# Patient Record
Sex: FEMALE | Race: WHITE | NOT HISPANIC OR LATINO | Employment: PART TIME | URBAN - METROPOLITAN AREA
[De-identification: names, ages, dates, MRNs, and addresses within clinical notes are randomized per-mention and may not be internally consistent; named-entity substitution may affect disease eponyms.]

---

## 2019-09-03 ENCOUNTER — APPOINTMENT (OUTPATIENT)
Dept: RADIOLOGY | Facility: MEDICAL CENTER | Age: 59
End: 2019-09-03
Attending: EMERGENCY MEDICINE

## 2019-09-03 ENCOUNTER — HOSPITAL ENCOUNTER (EMERGENCY)
Facility: MEDICAL CENTER | Age: 59
End: 2019-09-03
Attending: EMERGENCY MEDICINE

## 2019-09-03 VITALS
SYSTOLIC BLOOD PRESSURE: 134 MMHG | HEIGHT: 67 IN | OXYGEN SATURATION: 96 % | BODY MASS INDEX: 26.3 KG/M2 | WEIGHT: 167.55 LBS | HEART RATE: 60 BPM | RESPIRATION RATE: 16 BRPM | TEMPERATURE: 98.2 F | DIASTOLIC BLOOD PRESSURE: 86 MMHG

## 2019-09-03 DIAGNOSIS — S60.511A ABRASION OF RIGHT HAND, INITIAL ENCOUNTER: ICD-10-CM

## 2019-09-03 DIAGNOSIS — S67.21XA CRUSHING INJURY OF RIGHT HAND, INITIAL ENCOUNTER: ICD-10-CM

## 2019-09-03 PROCEDURE — 29125 APPL SHORT ARM SPLINT STATIC: CPT

## 2019-09-03 PROCEDURE — 99284 EMERGENCY DEPT VISIT MOD MDM: CPT

## 2019-09-03 PROCEDURE — 303485 HCHG DRESSING MEDIUM

## 2019-09-03 PROCEDURE — 302874 HCHG BANDAGE ACE 2 OR 3""

## 2019-09-03 PROCEDURE — 73130 X-RAY EXAM OF HAND: CPT | Mod: RT

## 2019-09-03 RX ORDER — HYDROCODONE BITARTRATE AND ACETAMINOPHEN 5; 325 MG/1; MG/1
1 TABLET ORAL EVERY 4 HOURS PRN
Qty: 10 TAB | Refills: 0 | Status: SHIPPED | OUTPATIENT
Start: 2019-09-03 | End: 2019-09-08

## 2019-09-03 RX ORDER — CEPHALEXIN 500 MG/1
500 CAPSULE ORAL 4 TIMES DAILY
Qty: 40 CAP | Refills: 0 | Status: SHIPPED | OUTPATIENT
Start: 2019-09-03 | End: 2019-09-13

## 2019-09-03 ASSESSMENT — LIFESTYLE VARIABLES: DO YOU DRINK ALCOHOL: NO

## 2019-09-03 NOTE — ED PROVIDER NOTES
"ED Provider Note    CHIEF COMPLAINT  Chief Complaint   Patient presents with   • Hand Injury     right hand was caught under fork lift as it was lowering.       HPI  Vijaya Palacios is a 58 y.o. female who presents for evaluation of a hand injury.  Patient states she actually got her right hand caught underneath a forklift while he was lowering down resulting in a crush type injury.  The patient complains of pain and swelling to her right hand.  She denies any other injuries or complaints.    REVIEW OF SYSTEMS  See HPI for further details.  She denies major health problems such as hypertension, diabetes, seizures, cardiopulmonary disorders, gastrointestinal disorders.  All other systems negative.    PAST MEDICAL HISTORY  History reviewed. No pertinent past medical history.    FAMILY HISTORY  History reviewed. No pertinent family history.    SOCIAL HISTORY  Positive tobacco use; denies alcohol use;    SURGICAL HISTORY  Past Surgical History:   Procedure Laterality Date   • APPENDECTOMY         CURRENT MEDICATIONS  See nurse's notes    ALLERGIES  No Known Allergies    PHYSICAL EXAM  VITAL SIGNS: /90   Pulse 73   Temp 36.6 °C (97.9 °F) (Temporal)   Resp 16   Ht 1.702 m (5' 7\")   Wt 76 kg (167 lb 8.8 oz)   SpO2 96%   BMI 26.24 kg/m²    Constitutional: 58-year-old female, well developed, Well nourished, appears uncomfortable, oriented x3  HENT: Normocephalic, Atraumatic,   Cardiovascular: Regular rate and rhythm without mumurs, gallups, rubs   Thorax & Lungs: Normal Equal breath sounds, No respiratory distress, No wheezing, no stridor, no rales. No chest tenderness.   Abdomen: Soft, nontender, nondistended, no organomegaly, positive bowel sounds normal in quality. No guarding or rebound.  Skin: Good skin turgor, pink, warm, dry. No rashes, petechiae, purpura. Normal capillary refill.   Extremities: Intact distal pulses, No edema, No tenderness, No cyanosis,  Vascular: Pulses are 2+, symmetric in the upper and " lower extremities.  Musculoskeletal: Right hand: Patient has swelling ecchymosis over the dorsal aspect of the hand with superficial partial abrasion, but no full-thickness lacerations amenable to suture repair; pain with range of motion of the digits; motor, sensory, vascular intact distally; she is able to flex and extend the fingers  Neurologic: Alert & oriented x 3,  No gross focal deficits noted.   Psychiatric: Affect normal, Judgment normal, Mood normal.       RADIOLOGY/PROCEDURES  DX-HAND 3+ RIGHT   Final Result      Marked soft tissue swelling with no acute fracture identified.            COURSE & MEDICAL DECISION MAKING  Pertinent Labs & Imaging studies reviewed. (See chart for details)  Discussion: At this time, patient presents for crush injury to her right hand.  The patient has moderate swelling identified with ecchymosis and abrasion.  The abrasion was cleansed and dressed.  The patient was placed in a splint.  At this time, I see no evidence of compartment syndrome in the hand.  X-ray studies show no evidence of fracture.  In addition, I do not see evidence of extensor or flexor tendon injury.  At this time, the patient was splinted.  She was placed on analgesics and antibiotics.  I have asked her to follow-up closely with orthopedic surgery to ensure no evidence any occult injury and to monitor for any complications.  I have discussed the findings and treatment plan with patient and her .  They indicate that they are comfortable with this explanation disposition.    FINAL IMPRESSION  1. Crushing injury of right hand, initial encounter    2. Abrasion of right hand, initial encounter        PLAN  1.  Appropriate discharge instructions given  2.  Keflex 500 mg 40  3.  Lortab 7.5 mg #10;  databank reviewed; informed consent obtained;  4.  Follow-up with orthopedic surgery tomorrow    Electronically signed by: Guy G Gansert, 9/3/2019 11:49 AM

## 2019-09-03 NOTE — ED TRIAGE NOTES
Chief Complaint   Patient presents with   • Hand Injury     right hand was caught under fork lift as it was lowering.     Swelling to right hand. Distal CMS intact. Unknown last tdap. Explained triage process, to waiting room. Asked to inform RN if questions or concerns arise.

## 2019-09-03 NOTE — ED NOTES
Pt given discharge instructions and prescriptions. Pt instructed on follow up care and care at home. Pt verbalized understanding. RN to answer any questions pt and family had. VSS. Pt ambulated out to front lobby.

## 2025-06-21 ENCOUNTER — OFFICE VISIT (OUTPATIENT)
Dept: URGENT CARE | Facility: PHYSICIAN GROUP | Age: 65
End: 2025-06-21

## 2025-06-21 VITALS
DIASTOLIC BLOOD PRESSURE: 72 MMHG | OXYGEN SATURATION: 98 % | SYSTOLIC BLOOD PRESSURE: 118 MMHG | HEIGHT: 67 IN | TEMPERATURE: 97.2 F | RESPIRATION RATE: 18 BRPM | WEIGHT: 136 LBS | BODY MASS INDEX: 21.35 KG/M2 | HEART RATE: 77 BPM

## 2025-06-21 DIAGNOSIS — R21 RASH: Primary | ICD-10-CM

## 2025-06-21 PROCEDURE — 3074F SYST BP LT 130 MM HG: CPT | Performed by: FAMILY MEDICINE

## 2025-06-21 PROCEDURE — 3078F DIAST BP <80 MM HG: CPT | Performed by: FAMILY MEDICINE

## 2025-06-21 PROCEDURE — 99203 OFFICE O/P NEW LOW 30 MIN: CPT | Performed by: FAMILY MEDICINE

## 2025-06-21 RX ORDER — CLOTRIMAZOLE AND BETAMETHASONE DIPROPIONATE 10; .64 MG/G; MG/G
CREAM TOPICAL
Qty: 15 G | Refills: 1 | Status: SHIPPED | OUTPATIENT
Start: 2025-06-21

## 2025-06-21 NOTE — PROGRESS NOTES
"Chief Complaint:    Chief Complaint   Patient presents with    Insect Bite     (R) shoulder x 1 wk, pt states it hasn't gone away       History of Present Illness:    Rash on right shoulder x 1 week - started initially as a raised area that she felt was due to some kind of bug bite or insect sting. The area was itchy. Since then, the area has flattened, but there remains redness of the skin and now some redness is spreading out from the red patch of skin, like a \"tail\" is forming. She had initially tried OTC Hydrocortisone cream which helped the itching some. Last night she put topical Aloe on it and didn't help anything.      Past Medical History:    Past Medical History[1]    Past Surgical History:    Past Surgical History[2]    Social History:    Social History     Socioeconomic History    Marital status:      Spouse name: Not on file    Number of children: Not on file    Years of education: Not on file    Highest education level: Not on file   Occupational History    Not on file   Tobacco Use    Smoking status: Every Day     Current packs/day: 1.00     Types: Cigarettes    Smokeless tobacco: Never   Vaping Use    Vaping status: Never Used   Substance and Sexual Activity    Alcohol use: Not Currently    Drug use: Never    Sexual activity: Not on file   Other Topics Concern    Not on file   Social History Narrative    Not on file     Social Drivers of Health     Financial Resource Strain: Not on file   Food Insecurity: Not on file   Transportation Needs: Not on file   Physical Activity: Not on file   Stress: Not on file   Social Connections: Not on file   Intimate Partner Violence: Not on file   Housing Stability: Not on file     Family History:    History reviewed. No pertinent family history.    Medications:    Medications Ordered Prior to Encounter[3]    Allergies:    Allergies[4]      Vitals:    Vitals:    06/21/25 1050   BP: 118/72   Pulse: 77   Resp: 18   Temp: 36.2 °C (97.2 °F)   TempSrc: Temporal " "  SpO2: 98%   Weight: 61.7 kg (136 lb)   Height: 1.702 m (5' 7\")       Physical Exam:    Constitutional: Vital signs reviewed. Appears well-developed and well-nourished. No acute distress.   Eyes: Sclera white, conjunctivae clear.   ENT: External ears normal. Hearing normal.   Neck: Neck supple.   Pulmonary/Chest: Respirations non-labored.   Musculoskeletal: Normal gait. No muscular atrophy or weakness.  Neurological: Alert and oriented to person, place, and time. Muscle tone normal. Coordination normal.   Skin: Right anterior shoulder region has erythematous ovoid-shaped mildly rough patch, the borders are mildly more erythematous than the rest of the patch and there is some redness spreading out from this area at 4-5 o'clock position. No significant tenderness to palpation.  Psychiatric: Normal mood and affect. Behavior is normal. Judgment and thought content normal.       Assessment / Plan & Medical Decision Makin. Rash (Primary)  - clotrimazole-betamethasone (LOTRISONE) 1-0.05 % Cream; Apply thin film to rash up to 2 times a day only if needed.  Dispense: 15 g; Refill: 1       Discussed with her DDX, management options, and risks, benefits, and alternatives to treatment plan agreed upon.    Rash on right shoulder x 1 week - started initially as a raised area that she felt was due to some kind of bug bite or insect sting. The area was itchy. Since then, the area has flattened, but there remains redness of the skin and now some redness is spreading out from the red patch of skin, like a \"tail\" is forming. She had initially tried OTC Hydrocortisone cream which helped the itching some. Last night she put topical Aloe on it and didn't help anything.    Right anterior shoulder region has erythematous ovoid-shaped mildly rough patch, the borders are mildly more erythematous than the rest of the patch and there is some redness spreading out from this area at 4-5 o'clock position. No significant tenderness to " palpation.    The rash does not look to be bacterial that needs antibiotic treatment, recommended to treat with Lotrisone cream as needed. She is agreeable. She denies any Rx pills for treatment.    Agreeable to medication prescribed.    Discussed expected course of duration, time for improvement, and to seek follow-up in Emergency Room, urgent care, or with PCP if getting worse at any time or not improving within expected time frame.        [1] History reviewed. No pertinent past medical history.  [2]   Past Surgical History:  Procedure Laterality Date    APPENDECTOMY     [3]   No current outpatient medications on file prior to visit.     No current facility-administered medications on file prior to visit.   [4] No Known Allergies